# Patient Record
Sex: FEMALE | Race: WHITE | NOT HISPANIC OR LATINO | Employment: UNEMPLOYED | URBAN - METROPOLITAN AREA
[De-identification: names, ages, dates, MRNs, and addresses within clinical notes are randomized per-mention and may not be internally consistent; named-entity substitution may affect disease eponyms.]

---
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
## 2022-09-28 NOTE — PROGRESS NOTES
OB follow up     Chief Complaint: [facility] FU    Estela Lewis is a 18 y.o.  20w2d being seen today for her obstetrical visit.  Patient reports no complaints. Fetal movement: normal.    Review of Systems  No bleeding, No cramping/contractions     /70   Wt 69.9 kg (154 lb)   LMP 2018 (Exact Date)     FHT: present   Uterine Size: 20cm       Assessment/Plan: Low risk pregnancy    1) pregnancy at 20w2d: Anatomy US done today. Anatomy normal within the limits of US. Interval growth noted when compared to US done 18.    2) Rubella NI: Vaccinate PP.    3) s/p flu vaccine. Offer tDap at 28 weeks.    4) Teen pregnancy            Reviewed this stage of pregnancy  Problem list updated   No Follow-up on file.      Kezia Danielle DO    10/18/2018  2:06 PM      
patient